# Patient Record
Sex: MALE | Race: WHITE | HISPANIC OR LATINO | Employment: PART TIME | URBAN - METROPOLITAN AREA
[De-identification: names, ages, dates, MRNs, and addresses within clinical notes are randomized per-mention and may not be internally consistent; named-entity substitution may affect disease eponyms.]

---

## 2017-01-18 ENCOUNTER — LAB VISIT (OUTPATIENT)
Dept: LAB | Facility: HOSPITAL | Age: 62
End: 2017-01-18
Attending: INTERNAL MEDICINE

## 2017-01-18 ENCOUNTER — OFFICE VISIT (OUTPATIENT)
Dept: INTERNAL MEDICINE | Facility: CLINIC | Age: 62
End: 2017-01-18

## 2017-01-18 VITALS
BODY MASS INDEX: 38.64 KG/M2 | SYSTOLIC BLOOD PRESSURE: 165 MMHG | RESPIRATION RATE: 12 BRPM | HEIGHT: 65 IN | DIASTOLIC BLOOD PRESSURE: 93 MMHG | HEART RATE: 88 BPM | TEMPERATURE: 97 F | WEIGHT: 231.94 LBS

## 2017-01-18 DIAGNOSIS — I25.10 CORONARY ARTERY DISEASE, ANGINA PRESENCE UNSPECIFIED, UNSPECIFIED VESSEL OR LESION TYPE, UNSPECIFIED WHETHER NATIVE OR TRANSPLANTED HEART: Primary | ICD-10-CM

## 2017-01-18 DIAGNOSIS — I15.2 OBESITY, DIABETES, AND HYPERTENSION SYNDROME: ICD-10-CM

## 2017-01-18 DIAGNOSIS — E11.42 TYPE 2 DIABETES MELLITUS WITH DIABETIC POLYNEUROPATHY, WITHOUT LONG-TERM CURRENT USE OF INSULIN: ICD-10-CM

## 2017-01-18 DIAGNOSIS — I10 ESSENTIAL HYPERTENSION: ICD-10-CM

## 2017-01-18 DIAGNOSIS — N40.1 BENIGN NON-NODULAR PROSTATIC HYPERPLASIA WITH LOWER URINARY TRACT SYMPTOMS: ICD-10-CM

## 2017-01-18 DIAGNOSIS — Z87.39 HISTORY OF GOUT: ICD-10-CM

## 2017-01-18 DIAGNOSIS — I15.2 HYPERTENSION COMPLICATING DIABETES: ICD-10-CM

## 2017-01-18 DIAGNOSIS — E78.00 PURE HYPERCHOLESTEROLEMIA: ICD-10-CM

## 2017-01-18 DIAGNOSIS — E11.59 OBESITY, DIABETES, AND HYPERTENSION SYNDROME: ICD-10-CM

## 2017-01-18 DIAGNOSIS — E11.59 HYPERTENSION COMPLICATING DIABETES: ICD-10-CM

## 2017-01-18 DIAGNOSIS — E66.9 OBESITY, DIABETES, AND HYPERTENSION SYNDROME: ICD-10-CM

## 2017-01-18 DIAGNOSIS — I25.10 CORONARY ARTERY DISEASE, ANGINA PRESENCE UNSPECIFIED, UNSPECIFIED VESSEL OR LESION TYPE, UNSPECIFIED WHETHER NATIVE OR TRANSPLANTED HEART: ICD-10-CM

## 2017-01-18 DIAGNOSIS — E11.42 DIABETIC POLYNEUROPATHY ASSOCIATED WITH TYPE 2 DIABETES MELLITUS: ICD-10-CM

## 2017-01-18 DIAGNOSIS — E11.69 OBESITY, DIABETES, AND HYPERTENSION SYNDROME: ICD-10-CM

## 2017-01-18 DIAGNOSIS — E66.01 MORBID OBESITY DUE TO EXCESS CALORIES: ICD-10-CM

## 2017-01-18 DIAGNOSIS — E88.810 METABOLIC SYNDROME: ICD-10-CM

## 2017-01-18 LAB
ALBUMIN SERPL BCP-MCNC: 4.2 G/DL
ALP SERPL-CCNC: 98 IU/L
ALT SERPL W/O P-5'-P-CCNC: 41 IU/L
ANION GAP SERPL CALC-SCNC: 11 MMOL/L
AST SERPL-CCNC: 40 IU/L
BASOPHILS # BLD AUTO: 0.05 K/UL
BASOPHILS NFR BLD: 0.5 %
BILIRUB SERPL-MCNC: 0.8 MG/DL
BUN SERPL-MCNC: 9 MG/DL
CALCIUM SERPL-MCNC: 8.7 MG/DL
CHLORIDE SERPL-SCNC: 101 MMOL/L
CHOLEST/HDLC SERPL: 4.2 {RATIO}
CO2 SERPL-SCNC: 27 MMOL/L
COMPLEXED PSA SERPL-MCNC: 0.38 NG/ML
CREAT SERPL-MCNC: 0.62 MG/DL
DIFFERENTIAL METHOD: ABNORMAL
EOSINOPHIL # BLD AUTO: 0.2 K/UL
EOSINOPHIL NFR BLD: 1.7 %
ERYTHROCYTE [DISTWIDTH] IN BLOOD BY AUTOMATED COUNT: 13.3 %
EST. GFR  (AFRICAN AMERICAN): >60 ML/MIN/1.73 M^2
EST. GFR  (NON AFRICAN AMERICAN): >60 ML/MIN/1.73 M^2
GLUCOSE SERPL-MCNC: 223 MG/DL
HCT VFR BLD AUTO: 46.1 %
HDL/CHOLESTEROL RATIO: 23.9 %
HDLC SERPL-MCNC: 201 MG/DL
HDLC SERPL-MCNC: 48 MG/DL
HGB BLD-MCNC: 16.2 G/DL
LDLC SERPL CALC-MCNC: 120.6 MG/DL
LYMPHOCYTES # BLD AUTO: 3.9 K/UL
LYMPHOCYTES NFR BLD: 36.4 %
MCH RBC QN AUTO: 32.6 PG
MCHC RBC AUTO-ENTMCNC: 35.1 %
MCV RBC AUTO: 93 FL
MONOCYTES # BLD AUTO: 0.9 K/UL
MONOCYTES NFR BLD: 8.3 %
NEUTROPHILS # BLD AUTO: 5.7 K/UL
NEUTROPHILS NFR BLD: 52.8 %
NONHDLC SERPL-MCNC: 153 MG/DL
PLATELET # BLD AUTO: 268 K/UL
PMV BLD AUTO: 9.7 FL
POTASSIUM SERPL-SCNC: 4.2 MMOL/L
PROT SERPL-MCNC: 8.2 G/DL
RBC # BLD AUTO: 4.97 M/UL
SODIUM SERPL-SCNC: 139 MMOL/L
TRIGL SERPL-MCNC: 162 MG/DL
TSH SERPL DL<=0.005 MIU/L-ACNC: 2.33 UIU/ML
URATE SERPL-MCNC: 6.9 MG/DL
WBC # BLD AUTO: 10.82 K/UL

## 2017-01-18 PROCEDURE — 84550 ASSAY OF BLOOD/URIC ACID: CPT

## 2017-01-18 PROCEDURE — 99204 OFFICE O/P NEW MOD 45 MIN: CPT | Mod: PBBFAC,25,PN | Performed by: INTERNAL MEDICINE

## 2017-01-18 PROCEDURE — 85025 COMPLETE CBC W/AUTO DIFF WBC: CPT | Mod: PO

## 2017-01-18 PROCEDURE — 84153 ASSAY OF PSA TOTAL: CPT

## 2017-01-18 PROCEDURE — 80053 COMPREHEN METABOLIC PANEL: CPT | Mod: PO

## 2017-01-18 PROCEDURE — 93005 ELECTROCARDIOGRAM TRACING: CPT | Mod: PBBFAC,PN | Performed by: INTERNAL MEDICINE

## 2017-01-18 PROCEDURE — 80061 LIPID PANEL: CPT

## 2017-01-18 PROCEDURE — 99999 PR PBB SHADOW E&M-NEW PATIENT-LVL IV: CPT | Mod: PBBFAC,,, | Performed by: INTERNAL MEDICINE

## 2017-01-18 PROCEDURE — 99215 OFFICE O/P EST HI 40 MIN: CPT | Mod: S$PBB,,, | Performed by: INTERNAL MEDICINE

## 2017-01-18 PROCEDURE — 84443 ASSAY THYROID STIM HORMONE: CPT

## 2017-01-18 PROCEDURE — 83036 HEMOGLOBIN GLYCOSYLATED A1C: CPT | Mod: PO

## 2017-01-18 PROCEDURE — 36415 COLL VENOUS BLD VENIPUNCTURE: CPT | Mod: PO

## 2017-01-18 RX ORDER — GLIPIZIDE AND METFORMIN HCL 5; 500 MG/1; MG/1
1 TABLET, FILM COATED ORAL
COMMUNITY
End: 2017-01-18 | Stop reason: SDUPTHER

## 2017-01-18 RX ORDER — VALSARTAN 160 MG/1
160 TABLET ORAL DAILY
Qty: 90 TABLET | Refills: 3 | Status: SHIPPED | OUTPATIENT
Start: 2017-01-18 | End: 2018-01-18

## 2017-01-18 RX ORDER — TAMSULOSIN HYDROCHLORIDE 0.4 MG/1
0.4 CAPSULE ORAL DAILY
Qty: 90 CAPSULE | Refills: 3 | Status: SHIPPED | OUTPATIENT
Start: 2017-01-18 | End: 2018-01-18

## 2017-01-18 RX ORDER — ATORVASTATIN CALCIUM 20 MG/1
20 TABLET, FILM COATED ORAL DAILY
Qty: 90 TABLET | Refills: 3 | Status: SHIPPED | OUTPATIENT
Start: 2017-01-18 | End: 2020-06-28

## 2017-01-18 RX ORDER — AMLODIPINE BESYLATE 5 MG/1
5 TABLET ORAL 2 TIMES DAILY
COMMUNITY
End: 2017-01-18

## 2017-01-18 RX ORDER — GLIPIZIDE AND METFORMIN HCL 5; 500 MG/1; MG/1
1 TABLET, FILM COATED ORAL
Qty: 180 TABLET | Refills: 3 | Status: SHIPPED | OUTPATIENT
Start: 2017-01-18

## 2017-01-18 RX ORDER — GABAPENTIN 300 MG/1
300 CAPSULE ORAL 3 TIMES DAILY
Qty: 90 CAPSULE | Refills: 3 | Status: SHIPPED | OUTPATIENT
Start: 2017-01-18 | End: 2018-01-18

## 2017-01-18 NOTE — PROGRESS NOTES
"Subjective:       Patient ID: Juaquin Carpenter is a 61 y.o. male.    Chief Complaint: Establish Care; Medication Refill; Foot Pain (c/o tingling to bilateral legs and feet "pins"); and Shortness of Breath    HPI  Pt with a hx of CAD, HTN, DM, gout, establishing care.  Pt not checking sugars, ran out of meds.  C/O worsening pedal paresthesias, cramps, pain.  Nocturia x 6, polyuria.  No dysuria, hematuria.  No vision problems.  Denies CP, SOB.  Had a coronary stent in the past.  No recent gout flares but c/o shoulder, some neck pain.  Got a fluvax last fall.  Review of Systems   All other systems reviewed and are negative.      Objective:      Physical Exam   Constitutional: He appears well-developed. No distress.   HENT:   Head: Normocephalic.   Eyes: EOM are normal. No scleral icterus.   Neck: Normal range of motion. No tracheal deviation present.   Cardiovascular: Normal rate, regular rhythm, normal heart sounds and intact distal pulses.    Pulses:       Dorsalis pedis pulses are 3+ on the right side, and 3+ on the left side.        Posterior tibial pulses are 0 on the right side, and 0 on the left side.   Pulmonary/Chest: Effort normal. No respiratory distress.   Abdominal: Soft. Bowel sounds are normal. He exhibits no distension. There is no tenderness.   Genitourinary: Prostate normal. Rectal exam shows guaiac positive stool.   Musculoskeletal: He exhibits no edema.        Right foot: There is normal range of motion and no deformity.        Left foot: There is normal range of motion and no deformity.   Limited ROM L shoulder w/o impingement sign, no crepitation   Feet:   Right Foot:   Protective Sensation: 6 sites tested. 5 sites sensed.   Skin Integrity: Positive for callus and dry skin.   Left Foot:   Protective Sensation: 6 sites tested. 4 sites sensed.   Skin Integrity: Negative for callus or dry skin.   Neurological: He is alert.   Skin: Skin is warm and dry. No rash noted. He is not diaphoretic. No erythema. "   Psychiatric: He has a normal mood and affect. His behavior is normal.   Vitals reviewed.      Assessment:       1. Coronary artery disease, angina presence unspecified, unspecified vessel or lesion type, unspecified whether native or transplanted heart    2. Type 2 diabetes mellitus with diabetic polyneuropathy, without long-term current use of insulin    3. Essential hypertension    4. Pure hypercholesterolemia    5. Diabetic polyneuropathy associated with type 2 diabetes mellitus    6. Hypertension complicating diabetes    7. Obesity, diabetes, and hypertension syndrome    8. Morbid obesity due to excess calories    9. Benign non-nodular prostatic hyperplasia with lower urinary tract symptoms    10. History of gout    11. Metabolic syndrome        Plan:       Juaquin was seen today for establish care, medication refill, foot pain and shortness of breath.    Diagnoses and all orders for this visit:    Coronary artery disease, angina presence unspecified, unspecified vessel or lesion type, unspecified whether native or transplanted heart  -     CBC auto differential; Future  -     Ambulatory referral to Cardiology  -     EKG 12-lead sinus tach at 101 w/o ischemic changes  -     TSH; Future    Type 2 diabetes mellitus with diabetic polyneuropathy, without long-term current use of insulin  -     Hemoglobin A1c; Future  -     Ambulatory referral to Ophthalmology  -     glipizide-metformin (METAGLIP) 5-500 mg per tablet; Take 1 tablet by mouth 2 (two) times daily before meals.    Essential hypertension  -     valsartan (DIOVAN) 160 MG tablet; Take 1 tablet (160 mg total) by mouth once daily.    Pure hypercholesterolemia  -     Comprehensive metabolic panel; Future  -     Lipid panel; Future  -     atorvastatin (LIPITOR) 20 MG tablet; Take 1 tablet (20 mg total) by mouth once daily.    Diabetic polyneuropathy associated with type 2 diabetes mellitus  -     gabapentin (NEURONTIN) 300 MG capsule; Take 1 capsule (300 mg  total) by mouth 3 (three) times daily.    Hypertension complicating diabetes    Obesity, diabetes, and hypertension syndrome    Morbid obesity due to excess calories    Benign non-nodular prostatic hyperplasia with lower urinary tract symptoms  -     tamsulosin (FLOMAX) 0.4 mg Cp24; Take 1 capsule (0.4 mg total) by mouth once daily.  -     Prostate Specific Antigen, Diagnostic; Future    History of gout  -     Uric acid; Future    Metabolic syndrome      Return in about 1 month (around 2/18/2017).

## 2017-01-18 NOTE — MR AVS SNAPSHOT
[facility] - Internal Medicine  [address] [place] [address], [address]  [place] [address]  Phone: [phone]  Fax: [phone]                  Juaquin Carpenter   2017 8:45 AM   Office Visit    Description:  Male : 1955   Provider:  Juaquin Bobo MD   Department:  [facility] - Internal Medicine           Reason for Visit     Establish Care     Medication Refill     Foot Pain     Shortness of Breath           Diagnoses this Visit        Comments    Coronary artery disease, angina presence unspecified, unspecified vessel or lesion type, unspecified whether native or transplanted heart    -  Primary     Type 2 diabetes mellitus with diabetic polyneuropathy, without long-term current use of insulin         Essential hypertension         Pure hypercholesterolemia         Diabetic polyneuropathy associated with type 2 diabetes mellitus         Hypertension complicating diabetes         Obesity, diabetes, and hypertension syndrome         Morbid obesity due to excess calories         Benign non-nodular prostatic hyperplasia with lower urinary tract symptoms         History of gout         Metabolic syndrome                To Do List           Goals (5 Years of Data)     None      Follow-Up and Disposition     Return in about 1 month (around 2017).       These Medications        Disp Refills Start End    valsartan (DIOVAN) 160 MG tablet 90 tablet 3 2017    Take 1 tablet (160 mg total) by mouth once daily. - Oral    Pharmacy: [address] Ph #: [phone]       atorvastatin (LIPITOR) 20 MG tablet 90 tablet 3 2017    Take 1 tablet (20 mg total) by mouth once daily. - Oral    Pharmacy: [address] Ph #: [phone]       tamsulosin (FLOMAX) 0.4 mg Cp24 90 capsule 3 2017    Take 1 capsule (0.4 mg total) by mouth once daily. - Oral    Pharmacy: [address]  [address] #: [phone]       gabapentin (NEURONTIN) 300 MG capsule 90 capsule 3 1/18/2017 1/18/2018    Take 1 capsule (300 mg total) by mouth 3 (three) times daily. - Oral    Pharmacy: [address] Ph #: [phone]       glipizide-metformin (METAGLIP) 5-500 mg per tablet 180 tablet 3 1/18/2017     Take 1 tablet by mouth 2 (two) times daily before meals. - Oral    Pharmacy: [address] Ph #: [phone]         [facility] On Call     [facility] On Call Nurse Care Line - 24/7 Assistance  Registered nurses in the [facility] On Call Center provide clinical advisement, health education, appointment booking, and other advisory services.  Call for this free service at [phone].             Medications           Message regarding Medications     Verify the changes and/or additions to your medication regime listed below are the same as discussed with your clinician today.  If any of these changes or additions are incorrect, please notify your healthcare provider.        START taking these NEW medications        Refills    valsartan (DIOVAN) 160 MG tablet 3    Sig: Take 1 tablet (160 mg total) by mouth once daily.    Class: Normal    Route: Oral    atorvastatin (LIPITOR) 20 MG tablet 3    Sig: Take 1 tablet (20 mg total) by mouth once daily.    Class: Normal    Route: Oral    tamsulosin (FLOMAX) 0.4 mg Cp24 3    Sig: Take 1 capsule (0.4 mg total) by mouth once daily.    Class: Normal    Route: Oral    gabapentin (NEURONTIN) 300 MG capsule 3    Sig: Take 1 capsule (300 mg total) by mouth 3 (three) times daily.    Class: Normal    Route: Oral    glipizide-metformin (METAGLIP) 5-500 mg per tablet 3    Sig: Take 1 tablet by mouth 2 (two) times daily before meals.    Class: Normal    Route: Oral      STOP taking these medications     amlodipine (NORVASC) 5 MG tablet Take 5 mg by mouth 2 (two) times daily.           Verify  "that the below list of medications is an accurate representation of the medications you are currently taking.  If none reported, the list may be blank. If incorrect, please contact your healthcare provider. Carry this list with you in case of emergency.           Current Medications     glipizide-metformin (METAGLIP) 5-500 mg per tablet Take 1 tablet by mouth 2 (two) times daily before meals.    atorvastatin (LIPITOR) 20 MG tablet Take 1 tablet (20 mg total) by mouth once daily.    gabapentin (NEURONTIN) 300 MG capsule Take 1 capsule (300 mg total) by mouth 3 (three) times daily.    tamsulosin (FLOMAX) 0.4 mg Cp24 Take 1 capsule (0.4 mg total) by mouth once daily.    valsartan (DIOVAN) 160 MG tablet Take 1 tablet (160 mg total) by mouth once daily.           Clinical Reference Information           Vital Signs - Last Recorded  Most recent update: 1/18/2017  9:51 AM by Nilsa Key MA    BP Pulse Temp Resp Ht Wt    (!) 165/93 (BP Location: Left arm) 88 97 °F (36.1 °C) (Oral) 12 5' 5.08" (1.653 m) 105.2 kg (231 lb 14.8 oz)    BMI                38.5 kg/m2          Blood Pressure          Most Recent Value    BP  (!)  165/93      Allergies as of 1/18/2017     No Known Allergies      Immunizations Administered on Date of Encounter - 1/18/2017     None      Orders Placed During Today's Visit      Normal Orders This Visit    Ambulatory referral to Cardiology     Ambulatory referral to Ophthalmology     Future Labs/Procedures Expected by Expires    CBC auto differential  1/18/2017 3/19/2018    Comprehensive metabolic panel  1/18/2017 3/19/2018    Hemoglobin A1c  1/18/2017 3/19/2018    Lipid panel  1/18/2017 3/19/2018    Prostate Specific Antigen, Diagnostic  1/18/2017 3/19/2018    TSH  1/18/2017 3/19/2018    Uric acid  1/18/2017 3/19/2018    EKG 12-lead  As directed 1/18/2018      "

## 2017-01-19 LAB
ESTIMATED AVG GLUCOSE: 217 MG/DL
HBA1C MFR BLD HPLC: 9.2 %

## 2017-06-28 ENCOUNTER — PATIENT OUTREACH (OUTPATIENT)
Dept: ADMINISTRATIVE | Facility: HOSPITAL | Age: 62
End: 2017-06-28

## 2017-06-28 NOTE — PROGRESS NOTES
Phoned patient to confirm PCP, discuss overdue HM items and schedule office visit. Unable to reach, phone just kept ringing and never went to a voicemail.

## 2017-09-15 DIAGNOSIS — E11.9 TYPE 2 DIABETES MELLITUS WITHOUT COMPLICATION: ICD-10-CM

## 2020-06-28 ENCOUNTER — HOSPITAL ENCOUNTER (EMERGENCY)
Facility: HOSPITAL | Age: 65
Discharge: HOME | End: 2020-06-28
Attending: EMERGENCY MEDICINE
Payer: COMMERCIAL

## 2020-06-28 VITALS
HEART RATE: 112 BPM | HEIGHT: 68 IN | RESPIRATION RATE: 20 BRPM | OXYGEN SATURATION: 96 % | BODY MASS INDEX: 35.26 KG/M2 | TEMPERATURE: 100 F | SYSTOLIC BLOOD PRESSURE: 149 MMHG | DIASTOLIC BLOOD PRESSURE: 81 MMHG

## 2020-06-28 DIAGNOSIS — J18.9 PNEUMONIA OF LEFT LOWER LOBE DUE TO INFECTIOUS ORGANISM: ICD-10-CM

## 2020-06-28 DIAGNOSIS — U07.1 COVID-19 VIRUS DETECTED: ICD-10-CM

## 2020-06-28 DIAGNOSIS — U07.1 COVID-19 VIRUS INFECTION: Primary | ICD-10-CM

## 2020-06-28 LAB — SARS-COV-2 RDRP RESP QL NAA+PROBE: POSITIVE

## 2020-06-28 PROCEDURE — 99284 EMERGENCY DEPT VISIT MOD MDM: CPT | Mod: 25,ER

## 2020-06-28 PROCEDURE — U0002 COVID-19 LAB TEST NON-CDC: HCPCS | Mod: ER

## 2020-06-28 RX ORDER — AMLODIPINE BESYLATE 10 MG/1
10 TABLET ORAL DAILY
COMMUNITY

## 2020-06-28 RX ORDER — METOPROLOL SUCCINATE 100 MG/1
100 TABLET, EXTENDED RELEASE ORAL DAILY
COMMUNITY

## 2020-06-28 RX ORDER — CYCLOBENZAPRINE HCL 10 MG
10 TABLET ORAL 3 TIMES DAILY PRN
COMMUNITY

## 2020-06-28 RX ORDER — GUAIFENESIN 100 MG/5ML
100-200 SOLUTION ORAL EVERY 4 HOURS PRN
Qty: 60 ML | Refills: 0 | Status: SHIPPED | OUTPATIENT
Start: 2020-06-28 | End: 2020-07-08

## 2020-06-28 RX ORDER — AZITHROMYCIN 250 MG/1
TABLET, FILM COATED ORAL
Qty: 6 TABLET | Refills: 0 | Status: SHIPPED | OUTPATIENT
Start: 2020-06-28

## 2020-06-28 RX ORDER — METFORMIN HYDROCHLORIDE 1000 MG/1
1000 TABLET ORAL 2 TIMES DAILY WITH MEALS
COMMUNITY

## 2020-06-28 NOTE — Clinical Note
Juaquin Carpenter was seen and treated in our emergency department on 6/28/2020.  He may return to work on 07/08/2020.  Covid 19 positive. No work for 10 days AND at least 3 days without symptoms.      If you have any questions or concerns, please don't hesitate to call.      HECTOR Finch

## 2020-06-28 NOTE — ED PROVIDER NOTES
NAME:  Juaquin Carpenter  [facility]:     [id number]  MRN:    [id number]  ADMIT DATE: 6/28/2020        EMERGENCY DEPARTMENT ENCOUNTER    CHIEF COMPLAINT    Chief Complaint   Patient presents with    Fever     Pt was sent from urgent care to ER. States pt has 2 day hx of dyspnea, body aches and fever. PA speaking to pt. Denies chest pain, cough. Pt taking cough meds, last dose 1400.    Shortness of Breath         HPI    Juaquin Carpenter is a 64 y.o. male who  has a past medical history of Diabetes mellitus, type 2 and Hypertension.    Patient presents with 2 day hsitory of dry cough and mild shortness of breath. He has been exposed to Covid 19 at work. No chest pain. No GI symptoms.   They are not a healthcare worker, immunocompromised due to transplant, medications/chemotherapy or active cancer.   Patient does not receive hemodialysis for ESRD nor do they have chronic lung disease.  The patient does not live/work in a communal setting such as a nursing/group home or shelter.           ALLERGIES    Review of patient's allergies indicates:  No Known Allergies      PAST MEDICAL HISTORY  Past Medical History:   Diagnosis Date    Diabetes mellitus, type 2     Hypertension          SURGICAL HISTORY    History reviewed. No pertinent surgical history.      SOCIAL HISTORY    Social History     Socioeconomic History    Marital status:      Spouse name: Not on file    Number of children: Not on file    Years of education: Not on file    Highest education level: Not on file   Occupational History    Not on file   Social Needs    Financial resource strain: Not on file    Food insecurity     Worry: Not on file     Inability: Not on file    Transportation needs     Medical: Not on file     Non-medical: Not on file   Tobacco Use    Smoking status: Former Smoker    Smokeless tobacco: Never Used   Substance and Sexual Activity    Alcohol use: Yes    Drug use: No    Sexual activity: Never   Lifestyle    Physical activity      Days per week: Not on file     Minutes per session: Not on file    Stress: Not on file   Relationships    Social connections     Talks on phone: Not on file     Gets together: Not on file     Attends [employer] service: Not on file     Active member of club or organization: Not on file     Attends meetings of clubs or organizations: Not on file     Relationship status: Not on file   Other Topics Concern    Not on file   Social History Narrative    Not on file         FAMILY HISTORY    Family History   Problem Relation Age of Onset    Diabetes Father          REVIEW OF SYSTEMS   Review of Systems  As per HPI and below:  --  General:  (+)  fever, (+)  chills, (+)   fatigue, (+) appetite change   --  HENT:  (-)  congestion, (-) sore throat , - anosmia  --  EYE: (-) visual changes, (-) eye pain    --  Allergy and Immunology:  (-) seasonal allergies  --  Hematological:  (-) easy bleeding/bruising  --  Respiratory:  (+)  cough, (+)  shortness of breath  --  Cardiovascular:  (-) chest pain  --  Gastrointestinal:  (-) abdominal pain, (-) change in bowel habits, (-) nausea   --  Genito-Urinary:  (-) dysuria, (-) flank pain    --  Dermatological:  (-) rash   --  Musculoskeletal:  (+) myalgias, (-) back pain   --  Neurological:  (-) for headache  --  Psychological:  (+) sleep disturbance   --  All other systems reviewed and otherwise negative.      PHYSICAL EXAM    Reviewed Triage Note    VITAL SIGNS:   Initial Vitals [06/28/20 1714]   BP Pulse Resp Temp SpO2   (!) 157/80 (!) 119 (!) 26 100 °F (37.8 °C) 96 %      MAP       --              Physical Exam    Nursing Notes and Triage Vitals reviewed by me.    Telemedicine exam performed via [employer]     Constitutional:  Well developed, well nourished, no apparent distress.  HENT:  Normocephalic, atraumatic.  Nose:  No rhinorrhea or discharge noted.  Pharynx:  Mucous membranes moist, no erythema per tele-presenter.  No tenderness to frontal/maxillary sinuses on exam by  tele-presenter.  Eyes:  No conjunctival injection, pallor or icterus.  No discharge.  Neck: Normal range of motion.  No cervical adenopathy palpated per patient self-exam.  Respiratory:  No respiratory distress or conversational dyspnea. No accessory muscle use or retractions.  Cardiovascular:  No perioral cyanosis, cap refill < 2 sec on patient self-exam.  Musculoskeletal:  No gross deformity or limited range of motion of all major joints.  Integument:  Warm and dry. No rash.  Neurologic:  Alert and oriented x3, GCS 15. Moving all extremities. No aphasia.   Psychiatric:  Affect normal. Mood normal.  Normal behavior.        LABS  Pertinent labs reviewed. (See chart for details)   Labs Reviewed   SARS-COV-2 RNA AMPLIFICATION, QUAL - Abnormal; Notable for the following components:       Result Value    SARS-CoV-2 RNA, Amplification, Qual Positive (*)     All other components within normal limits         RADIOLOGY    Imaging Results          X-Ray Chest AP Portable (Final result)  Result time 06/28/20 17:55:55    Final result by LINDSEY Garcia Sr., MD (06/28/20 17:55:55)                 Impression:      1. There is a subtle amount of haziness in the lateral aspect of the base of the left lung.  This is characteristic of atelectasis or pneumonia.  2. There is a streaky opacity in the superior aspect of the right lung.  This is characteristic of subsegmental atelectasis or scarring.  3. The borders of the heart are not well seen.  This is characteristic of a pericardial fat pad.  4. The left costophrenic angle is not well seen secondary to overlying ribs.  5. The superior aspect of the thoracic spine is tilted towards the left.  This may be positional.  .      Electronically signed by: Chris Garcia MD  Date:    06/28/2020  Time:    17:55             Narrative:    EXAMINATION:  XR CHEST AP PORTABLE    CLINICAL HISTORY:  Suspected Covid-19 Virus Infection;    COMPARISON:  None    FINDINGS:  The borders of the heart  are not well seen.  There is a streaky opacity in the superior aspect of the right lung.  There is a subtle amount of haziness in the lateral aspect of the base of the left lung.  The right costophrenic angle is sharp.  The left costophrenic angle is not well seen secondary to overlying ribs.  There is no pneumothorax.  The superior aspect of the thoracic spine is tilted towards the left.                                  PROCEDURES    Procedures      EKG               ED COURSE & MEDICAL DECISION MAKING:  Rx for azithromycin for pneumonia. No hypoxia.  Advised on supportive care for Covid 19. Return to the ED immediately for increased shortness of breath, chest pain or worse in any way.       Medications - No data to display              IMPRESSION:    ICD-10-CM ICD-9-CM   1. COVID-19 virus infection  U07.1    2. Pneumonia of left lower lobe due to infectious organism  J18.1 486         DISPOSITION:   ED Disposition Condition    Discharge Stable              PRESCRIPTIONS:   ED Prescriptions     Medication Sig Dispense Start Date End Date Auth. Provider    azithromycin (ZITHROMAX Z-CATY) 250 MG tablet Take 2 tablets today and then 1 tablet daily day 2-5 6 tablet 6/28/2020  HECTOR Finch    guaifenesin 100 mg/5 ml (ROBITUSSIN) 100 mg/5 mL syrup Take 5-10 mLs (100-200 mg total) by mouth every 4 (four) hours as needed for Cough. 60 mL 6/28/2020 7/8/2020 HECTOR Finch               Virtual Onsite Care Note:  This emergency department encounter was performed via [employer], a HIPAA-compliant virtual exam application, in concert with a tele-presenter in the room. A face to face evaluation was available to the patient in the case it was deemed necessary by me or the Emergency Department staff.       DISCLAIMER: This note was prepared with [employer] voice recognition transcription software. Garbled syntax, mangled pronouns, and other bizarre constructions may be attributed to that software system.          HECTOR Finhc  06/28/20 1820

## 2020-06-28 NOTE — ED NOTES
PA speaking to pt, FRANKLYN in use. D/C instructions given. Pt seen, evaluated and discharged by provider.